# Patient Record
Sex: FEMALE | Race: WHITE | Employment: UNEMPLOYED | URBAN - NONMETROPOLITAN AREA
[De-identification: names, ages, dates, MRNs, and addresses within clinical notes are randomized per-mention and may not be internally consistent; named-entity substitution may affect disease eponyms.]

---

## 2019-02-20 ENCOUNTER — HOSPITAL ENCOUNTER (EMERGENCY)
Age: 34
Discharge: HOME | End: 2019-02-20
Attending: EMERGENCY MEDICINE
Payer: MEDICARE

## 2019-02-20 VITALS
WEIGHT: 170 LBS | SYSTOLIC BLOOD PRESSURE: 113 MMHG | RESPIRATION RATE: 16 BRPM | OXYGEN SATURATION: 95 % | HEART RATE: 101 BPM | TEMPERATURE: 98.4 F | HEIGHT: 65 IN | DIASTOLIC BLOOD PRESSURE: 62 MMHG | BODY MASS INDEX: 28.32 KG/M2

## 2019-02-20 DIAGNOSIS — L02.91 ABSCESS: Primary | ICD-10-CM

## 2019-02-20 LAB
ALBUMIN SERPL-MCNC: 3.7 GM/DL (ref 3.4–5)
ALP BLD-CCNC: 140 IU/L (ref 40–129)
ALT SERPL-CCNC: 46 U/L (ref 10–40)
ANION GAP SERPL CALCULATED.3IONS-SCNC: 9 MMOL/L (ref 4–16)
AST SERPL-CCNC: 70 IU/L (ref 15–37)
ATYPICAL LYMPHOCYTE ABSOLUTE COUNT: ABNORMAL
BASOPHILS ABSOLUTE: 0 K/CU MM
BASOPHILS RELATIVE PERCENT: 0.5 % (ref 0–1)
BILIRUB SERPL-MCNC: 0.6 MG/DL (ref 0–1)
BUN BLDV-MCNC: 14 MG/DL (ref 6–23)
CALCIUM SERPL-MCNC: 9.1 MG/DL (ref 8.3–10.6)
CHLORIDE BLD-SCNC: 97 MMOL/L (ref 99–110)
CO2: 28 MMOL/L (ref 21–32)
CREAT SERPL-MCNC: 0.6 MG/DL (ref 0.6–1.1)
DIFFERENTIAL TYPE: ABNORMAL
EOSINOPHILS ABSOLUTE: 0.1 K/CU MM
EOSINOPHILS RELATIVE PERCENT: 0.9 % (ref 0–3)
GFR AFRICAN AMERICAN: >60 ML/MIN/1.73M2
GFR NON-AFRICAN AMERICAN: >60 ML/MIN/1.73M2
GLUCOSE BLD-MCNC: 114 MG/DL (ref 70–99)
HCT VFR BLD CALC: 41.4 % (ref 37–47)
HEMOGLOBIN: 13.3 GM/DL (ref 12.5–16)
IMMATURE NEUTROPHIL %: 0.3 % (ref 0–0.43)
LACTATE: 0.8 MMOL/L (ref 0.4–2)
LYMPHOCYTES ABSOLUTE: 1.7 K/CU MM
LYMPHOCYTES RELATIVE PERCENT: 20.9 % (ref 24–44)
MCH RBC QN AUTO: 26.9 PG (ref 27–31)
MCHC RBC AUTO-ENTMCNC: 32.1 % (ref 32–36)
MCV RBC AUTO: 83.6 FL (ref 78–100)
MONOCYTES ABSOLUTE: 0.6 K/CU MM
MONOCYTES RELATIVE PERCENT: 7.2 % (ref 0–4)
PDW BLD-RTO: 13.5 % (ref 11.7–14.9)
PLATELET # BLD: 267 K/CU MM (ref 140–440)
PMV BLD AUTO: 11.2 FL (ref 7.5–11.1)
POTASSIUM SERPL-SCNC: 3.4 MMOL/L (ref 3.5–5.1)
RBC # BLD: 4.95 M/CU MM (ref 4.2–5.4)
SEGMENTED NEUTROPHILS ABSOLUTE COUNT: 5.5 K/CU MM
SEGMENTED NEUTROPHILS RELATIVE PERCENT: 70.2 % (ref 36–66)
SODIUM BLD-SCNC: 134 MMOL/L (ref 135–145)
TOTAL IMMATURE NEUTOROPHIL: 0.02 K/CU MM
TOTAL PROTEIN: 8.2 GM/DL (ref 6.4–8.2)
WBC # BLD: 7.9 K/CU MM (ref 4–10.5)

## 2019-02-20 PROCEDURE — 6370000000 HC RX 637 (ALT 250 FOR IP): Performed by: EMERGENCY MEDICINE

## 2019-02-20 PROCEDURE — 87071 CULTURE AEROBIC QUANT OTHER: CPT

## 2019-02-20 PROCEDURE — 87186 SC STD MICRODIL/AGAR DIL: CPT

## 2019-02-20 PROCEDURE — 87040 BLOOD CULTURE FOR BACTERIA: CPT

## 2019-02-20 PROCEDURE — 4500000028 HC INTERMEDIATE PROCEDURE

## 2019-02-20 PROCEDURE — 87073 CULTURE BACTERIA ANAEROBIC: CPT

## 2019-02-20 PROCEDURE — 83605 ASSAY OF LACTIC ACID: CPT

## 2019-02-20 PROCEDURE — 6370000000 HC RX 637 (ALT 250 FOR IP)

## 2019-02-20 PROCEDURE — 85025 COMPLETE CBC W/AUTO DIFF WBC: CPT

## 2019-02-20 PROCEDURE — 99283 EMERGENCY DEPT VISIT LOW MDM: CPT

## 2019-02-20 PROCEDURE — 80053 COMPREHEN METABOLIC PANEL: CPT

## 2019-02-20 PROCEDURE — 2580000003 HC RX 258: Performed by: EMERGENCY MEDICINE

## 2019-02-20 PROCEDURE — 87077 CULTURE AEROBIC IDENTIFY: CPT

## 2019-02-20 RX ORDER — 0.9 % SODIUM CHLORIDE 0.9 %
1000 INTRAVENOUS SOLUTION INTRAVENOUS ONCE
Status: COMPLETED | OUTPATIENT
Start: 2019-02-20 | End: 2019-02-20

## 2019-02-20 RX ORDER — SULFAMETHOXAZOLE AND TRIMETHOPRIM 800; 160 MG/1; MG/1
1 TABLET ORAL ONCE
Status: COMPLETED | OUTPATIENT
Start: 2019-02-20 | End: 2019-02-20

## 2019-02-20 RX ORDER — SULFAMETHOXAZOLE AND TRIMETHOPRIM 800; 160 MG/1; MG/1
1 TABLET ORAL 2 TIMES DAILY
Qty: 14 TABLET | Refills: 0 | Status: SHIPPED | OUTPATIENT
Start: 2019-02-20 | End: 2019-02-27

## 2019-02-20 RX ORDER — CEPHALEXIN 500 MG/1
500 CAPSULE ORAL 4 TIMES DAILY
Qty: 28 CAPSULE | Refills: 0 | Status: SHIPPED | OUTPATIENT
Start: 2019-02-20 | End: 2019-02-27

## 2019-02-20 RX ORDER — CEPHALEXIN 500 MG/1
500 CAPSULE ORAL ONCE
Status: COMPLETED | OUTPATIENT
Start: 2019-02-20 | End: 2019-02-20

## 2019-02-20 RX ORDER — IBUPROFEN 600 MG/1
TABLET ORAL
Status: COMPLETED
Start: 2019-02-20 | End: 2019-02-20

## 2019-02-20 RX ADMIN — Medication 3 ML: at 14:29

## 2019-02-20 RX ADMIN — IBUPROFEN: 600 TABLET ORAL at 17:18

## 2019-02-20 RX ADMIN — SODIUM CHLORIDE 1000 ML: 9 INJECTION, SOLUTION INTRAVENOUS at 14:29

## 2019-02-20 RX ADMIN — CEPHALEXIN 500 MG: 500 CAPSULE ORAL at 17:02

## 2019-02-20 RX ADMIN — SULFAMETHOXAZOLE AND TRIMETHOPRIM 1 TABLET: 800; 160 TABLET ORAL at 17:02

## 2019-02-20 ASSESSMENT — PAIN DESCRIPTION - LOCATION: LOCATION: CHEST;ABDOMEN;LEG

## 2019-02-20 ASSESSMENT — PAIN SCALES - GENERAL
PAINLEVEL_OUTOF10: 8
PAINLEVEL_OUTOF10: 8

## 2019-02-24 LAB
CULTURE: NORMAL
Lab: NORMAL
Lab: NORMAL
ORGANISM: NORMAL
REPORT STATUS: NORMAL
REPORT STATUS: NORMAL
SPECIMEN: NORMAL
SPECIMEN: NORMAL

## 2019-02-26 LAB
CULTURE: NORMAL
CULTURE: NORMAL
Lab: NORMAL
Lab: NORMAL
REPORT STATUS: NORMAL
REPORT STATUS: NORMAL
SPECIMEN: NORMAL
SPECIMEN: NORMAL

## 2020-02-08 ENCOUNTER — HOSPITAL ENCOUNTER (EMERGENCY)
Age: 35
Discharge: HOME | End: 2020-02-08
Attending: EMERGENCY MEDICINE
Payer: MEDICARE

## 2020-02-08 VITALS
TEMPERATURE: 98.6 F | WEIGHT: 170 LBS | HEART RATE: 98 BPM | BODY MASS INDEX: 28.32 KG/M2 | DIASTOLIC BLOOD PRESSURE: 75 MMHG | HEIGHT: 65 IN | OXYGEN SATURATION: 95 % | SYSTOLIC BLOOD PRESSURE: 110 MMHG | RESPIRATION RATE: 13 BRPM

## 2020-02-08 LAB
ALCOHOL SCREEN SERUM: 0.25 %WT/VOL
GLUCOSE BLD-MCNC: 107 MG/DL (ref 70–99)

## 2020-02-08 PROCEDURE — 99285 EMERGENCY DEPT VISIT HI MDM: CPT

## 2020-02-08 PROCEDURE — G0480 DRUG TEST DEF 1-7 CLASSES: HCPCS

## 2020-02-08 PROCEDURE — 96372 THER/PROPH/DIAG INJ SC/IM: CPT

## 2020-02-08 PROCEDURE — 82962 GLUCOSE BLOOD TEST: CPT

## 2020-02-08 PROCEDURE — 6360000002 HC RX W HCPCS: Performed by: EMERGENCY MEDICINE

## 2020-02-08 RX ORDER — LORAZEPAM 2 MG/ML
2 INJECTION INTRAMUSCULAR ONCE
Status: COMPLETED | OUTPATIENT
Start: 2020-02-08 | End: 2020-02-08

## 2020-02-08 RX ORDER — ZIPRASIDONE MESYLATE 20 MG/ML
20 INJECTION, POWDER, LYOPHILIZED, FOR SOLUTION INTRAMUSCULAR ONCE
Status: COMPLETED | OUTPATIENT
Start: 2020-02-08 | End: 2020-02-08

## 2020-02-08 RX ADMIN — LORAZEPAM 2 MG: 2 INJECTION, SOLUTION INTRAMUSCULAR; INTRAVENOUS at 02:51

## 2020-02-08 RX ADMIN — ZIPRASIDONE MESYLATE 20 MG: 20 INJECTION, POWDER, LYOPHILIZED, FOR SOLUTION INTRAMUSCULAR at 02:54

## 2020-02-08 NOTE — ED NOTES
Pt yelling incomprehensible words, trying to get out of bed, trying to spit on staff. Pt is attempting to kick staff after removing her legs from in between the side rails and holding onto the side rail with both hands. Four staff members are attempting to hold her down to keep from crawling out of the bed. She is combative and all over the bed. Dr Louann Orellana is at bedside.        Marti Hill RN  02/08/20 0345

## 2020-02-08 NOTE — ED NOTES
Pt out in hallway with coat on, states she is leaving, Dr. Zeferino Broussard continues to try to reason with pt to not leave, and we would look for belongings. Called security to see if pt medications were locked.      Young Renee RN  02/08/20 [id number]

## 2020-02-08 NOTE — ED NOTES
Restart Timer - Restart Rounding Timer: Yes  Automatic Restart Rounding Timer: Yes   Precautions - Precautions: Fall risk  Negative Pressure Room: No  Positive Pressure Room: No   Safe Environment - Call Light Within Reach: Yes  Bed In Lowest Position: Yes  Bed Wheels Locked: Yes  Siderails Up: 2/2  NonSkid Footwear: Patient in bed  Video monitor in use: Yes   Telemetry Details - Telemetry Monitor On: Yes  Telemetry Audible: Yes  Telemetry Alarms Set: Yes   Fall Risk Interventions - Hourly Visual Checks: Quiet; Eyes closed; In bed  Room Door Open:  Yes  Patient Moved Closer to Nursing Station: Yes   Mobility - Activity: In bed      Marisol Candie [facility]  02/08/20 5776

## 2020-02-08 NOTE — ED PROVIDER NOTES
The history is provided by the EMS personnel and the police. Alcohol Intoxication   Similar prior episodes: yes    Severity:  Severe  Onset quality:  Unable to specify  Timing:  Constant  Progression:  Unable to specify  Chronicity:  Chronic  Suspected agents:  Alcohol  Associated symptoms: agitation and confusion    Associated symptoms comment:  Patient was beating on people doors and being combative. Police brought her to the ER with EMS       Review of Systems   Unable to perform ROS: Acuity of condition   Psychiatric/Behavioral: Positive for agitation and confusion. No family history on file.   Social History     Socioeconomic History    Marital status:      Spouse name: Not on file    Number of children: Not on file    Years of education: Not on file    Highest education level: Not on file   Occupational History    Not on file   Social Needs    Financial resource strain: Not on file    Food insecurity:     Worry: Not on file     Inability: Not on file    Transportation needs:     Medical: Not on file     Non-medical: Not on file   Tobacco Use    Smoking status: Current Every Day Smoker     Packs/day: 1.00     Types: Cigarettes    Smokeless tobacco: Never Used   Substance and Sexual Activity    Alcohol use: No     Comment: Rarely    Drug use: Yes     Types: Marijuana     Comment: heroin, crack, xanax    Sexual activity: Not on file   Lifestyle    Physical activity:     Days per week: Not on file     Minutes per session: Not on file    Stress: Not on file   Relationships    Social connections:     Talks on phone: Not on file     Gets together: Not on file     Attends Baptism service: Not on file     Active member of club or organization: Not on file     Attends meetings of clubs or organizations: Not on file     Relationship status: Not on file    Intimate partner violence:     Fear of current or ex partner: Not on file     Emotionally abused: Not on file     Physically abused: Not on file     Forced sexual activity: Not on file   Other Topics Concern    Not on file   Social History Narrative    Not on file     Past Surgical History:   Procedure Laterality Date    ARM DEBRIDEMENT      left upper arm for MRSA    LUNG SURGERY      Left lung for empyema     Past Medical History:   Diagnosis Date    Arthritis     Empyema lung ([address]     2010 to left lung    Hypoglycemia     MRSA (methicillin resistant Staphylococcus aureus)     Prior to left upper arm    PTSD (post-traumatic stress disorder)     Scoliosis      No Known Allergies  Prior to Admission medications    Medication Sig Start Date End Date Taking? Authorizing Provider   naproxen (EC NAPROSYN) 500 MG EC tablet Take 500 mg by mouth 2 times daily (with meals). Historical Provider, MD   citalopram (CELEXA) 20 MG tablet Take 20 mg by mouth daily. Historical Provider, MD   traMADol (ULTRAM) 50 MG tablet Take 50 mg by mouth every 6 hours as needed for Pain. Historical Provider, MD   ALPRAZolam Arlys Trino) 0.5 MG tablet Take 0.5 mg by mouth 3 times daily as needed for Sleep. Historical Provider, MD   cyclobenzaprine (FLEXERIL) 10 MG tablet Take 1 tablet by mouth 3 times daily as needed for Muscle spasms. 3/31/14   Climmie Dose, PA       BP 99/60   Pulse 88   Temp 98.6 °F (37 °C) (Oral)   Resp 13   Ht 5' 5\" (1.651 m)   Wt 170 lb (77.1 kg)   SpO2 96%   BMI 28.29 kg/m²     Physical Exam  Constitutional:       General: She is in acute distress. HENT:      Head: Normocephalic. Nose: Nose normal.      Mouth/Throat:      Mouth: Mucous membranes are moist.   Neck:      Musculoskeletal: Normal range of motion. Cardiovascular:      Rate and Rhythm: Normal rate. Pulmonary:      Effort: Pulmonary effort is normal.      Breath sounds: Normal breath sounds. Abdominal:      General: Abdomen is flat. There is no distension. Tenderness: There is no abdominal tenderness.    Musculoskeletal: Normal range of

## 2020-02-08 NOTE — ED NOTES
Pt is quiet, resting calmly with eyes closed and easy respirations. Pt did not respond to blood draw. SR up x2. Warm blankets applied.        Deneen Ulrich RN  02/08/20 [id number]

## 2020-02-08 NOTE — ED NOTES
Katja Hartman arrived to serve her papers but pt is not awake. Katja Hartman states she was running through the apartment complex yelling that she killed her father. Police received multiple phone calls from bystanders of the complex.        Leticia Carmona RN  02/08/20 [id number]

## 2020-02-08 NOTE — ED NOTES
Pt back into ed states she thinks we are trying to stall her to get her into trouble. Again we discussed trying to see if security has medications locked up.  Security called back and state there are no belongings for pt in security office, pt back in room, Dr. Pham Staples and security in to speak with her, pt begins to get more agitated and police werecontacted     Asael Harrison RN  02/08/20 [id number]

## 2020-02-08 NOTE — ED PROVIDER NOTES
Received signout from Dr. Kerwin Duong. Upon reevaluation, patient was awake and had no obvious clinical toxidrome. She had no ataxia no dysarthria walking with a steady gait tolerating p.o. diet. Patient says that she got drunk last night because she had a break-up. Patient has no physical complaints denies any coingestions. She denies any SI or HI no visual auditory hallucinations. She does not appear to respond to internal stimulus, she is well-groomed well Makes good eye contact. I discussed the importance of outpatient follow-up. Also discussed return precautions including new change worsening symptoms. Patient verbally acknowledged understood the instructions agreeable plan.   She is getting a ride from a friend      Elisabeth Moss MD  02/08/20 1012

## 2020-02-08 NOTE — ED NOTES
Pt back out into hallway and states she is leaving, crying stating again that she is embarrassed. Pt states she want to leave to go smoke, Dr. Trixie Mendez states she will not be allowed to walk in and out of ed. Patient walked out of ed, gait steady, pt had coat on and purse with her.      Dang August RN  02/08/20 8866

## 2020-02-08 NOTE — ED NOTES
Pt has multiple bruises found on lower bilateral legs to her thighs. She has pot marks and/or scaring, round in nature on bilateral legs. A portion of her Lt posterior upper arm has a large divot with healed skin over the hole.        Orlando Braxton RN  02/08/20 7568